# Patient Record
Sex: MALE | Race: OTHER | URBAN - METROPOLITAN AREA
[De-identification: names, ages, dates, MRNs, and addresses within clinical notes are randomized per-mention and may not be internally consistent; named-entity substitution may affect disease eponyms.]

---

## 2017-04-08 ENCOUNTER — OFFICE VISIT (OUTPATIENT)
Dept: FAMILY MEDICINE CLINIC | Age: 22
End: 2017-04-08

## 2017-04-08 ENCOUNTER — HOSPITAL ENCOUNTER (OUTPATIENT)
Dept: LAB | Age: 22
Discharge: HOME | End: 2017-04-08

## 2017-04-08 VITALS
WEIGHT: 168 LBS | DIASTOLIC BLOOD PRESSURE: 77 MMHG | BODY MASS INDEX: 22.75 KG/M2 | SYSTOLIC BLOOD PRESSURE: 119 MMHG | HEIGHT: 72 IN | HEART RATE: 70 BPM | TEMPERATURE: 98.1 F

## 2017-04-08 DIAGNOSIS — Z20.2 EXPOSURE TO STD: Primary | ICD-10-CM

## 2017-04-08 DIAGNOSIS — Z20.2 EXPOSURE TO STD: ICD-10-CM

## 2017-04-08 PROCEDURE — 87491 CHLMYD TRACH DNA AMP PROBE: CPT | Performed by: NURSE PRACTITIONER

## 2017-04-08 RX ORDER — AZITHROMYCIN 250 MG/1
TABLET, FILM COATED ORAL
Qty: 4 TAB | Refills: 0 | Status: SHIPPED | OUTPATIENT
Start: 2017-04-08 | End: 2017-04-13

## 2017-04-08 NOTE — PROGRESS NOTES
GoodRx coupon given for Azythromycin 250mg #4 patient to take all four at once, recommended with food in stomach. This has been fully explained to the patient, who indicates understanding.

## 2017-04-08 NOTE — PROGRESS NOTES
Assessment/Plan:       ICD-10-CM ICD-9-CM    1. Exposure to STD Z20.2 V01.6 CHLAMYDIA / GC AMPLIFICATION      azithromycin (ZITHROMAX) 250 mg tablet     Follow-up Disposition:  Return as needed. [facility]  Subjective:   Tammie Kirkland is a 25 y.o. OTHER male who speaks Kiswahili. Chief Complaint   Patient presents with    Exposure to STD     girlfriend is being treated for an sti pt is not sure the name of it    History of Present Illness: 3 wks ago. She went to [facility], they did some tests and pap smear and found she had an infection. Sometimes he feels itchiness. He has the name of the medicines she was given. Given Azithromycin 1mg. Review of Systems: Negative for: fever, chest pain, shortness of breath, leg swelling, exertional dyspnea, palpitations. Past Surgical History: He  has no past surgical history on file. Social History: He  reports that he has never smoked. He does not have any smokeless tobacco history on file. He reports that he does not drink alcohol or use illicit drugs. Objective:     Vitals:    04/08/17 1355   BP: 119/77   Pulse: 70   Temp: 98.1 °F (36.7 °C)   TempSrc: Oral   Weight: 168 lb (76.2 kg)   Height: 6' 0.05\" (1.83 m)    No LMP for male patient. Wt Readings from Last 2 Encounters:   04/08/17 168 lb (76.2 kg)   05/20/14 172 lb (78 kg) (75 %, Z= 0.68)*     * Growth percentiles are based on CDC 2-20 Years data. Lab Review:No results found for any visits on 04/08/17. Physical Examination:  General appearance - well developed, no acute distress. Chest - clear to auscultation. Heart - regular rate and rhythm   Abdomen - bowel sounds present x 4, NT.   - deferred per patient. Assessment/Plan:   Tammie was seen today for exposure to std. Diagnoses and all orders for this visit:    Exposure to STD  -     Maxwell Rebollar / Colton Slade; Future  -     azithromycin (ZITHROMAX) 250 mg tablet; Take 4 po today;  Ejssica 4 tab por ashley tamez    He showed me on his phone the medication and dosing that his partner was prescribed. I will test for gonorrhea and chlamydia and treat with 1gm azithromycin, to call if needing further treatment. Return if further problems. Follow-up Disposition:  Return as needed. Duglas Gomez, MSN, RN, FNP-BC, BC-ADM  Ludi Gwenda Ra Brenda Mcardle expressed understanding of this plan.

## 2017-04-08 NOTE — PROGRESS NOTES
Coordination of Care  1. Have you been to the ER, urgent care clinic since your last visit? Hospitalized since your last visit? No    2. Have you seen or consulted any other health care providers outside of the [address] [place] [address] since your last visit? Include any pap smears or colon screening. No    Medications  Medication Reconciliation Performed: no  Patient does not need refills     Learning Assessment Complete?  yes

## 2017-04-10 LAB
C TRACH DNA SPEC QL NAA+PROBE: POSITIVE
N GONORRHOEA DNA SPEC QL NAA+PROBE: NEGATIVE
SAMPLE TYPE: ABNORMAL
SERVICE CMNT-IMP: ABNORMAL
SPECIMEN SOURCE: ABNORMAL